# Patient Record
Sex: MALE | Race: BLACK OR AFRICAN AMERICAN | Employment: UNEMPLOYED | URBAN - METROPOLITAN AREA
[De-identification: names, ages, dates, MRNs, and addresses within clinical notes are randomized per-mention and may not be internally consistent; named-entity substitution may affect disease eponyms.]

---

## 2021-01-01 ENCOUNTER — HOSPITAL ENCOUNTER (EMERGENCY)
Age: 81
End: 2021-08-31
Attending: EMERGENCY MEDICINE
Payer: MEDICARE

## 2021-01-01 DIAGNOSIS — I46.9 CARDIOPULMONARY ARREST (HCC): Primary | ICD-10-CM

## 2021-01-01 PROCEDURE — 31500 INSERT EMERGENCY AIRWAY: CPT

## 2021-01-01 PROCEDURE — 99284 EMERGENCY DEPT VISIT MOD MDM: CPT

## 2021-08-31 NOTE — ED NOTES
Called Dr. Cazares Standard [phone] at [phone], speaking to Panchito Regalado 0900     Velma [facility]  08/31/21 8911

## 2021-08-31 NOTE — ED NOTES
This RN spoke with Randall at the 's office. Pt is \"not a 's case\".      Marti Choi RN  08/31/21 55

## 2021-08-31 NOTE — ED NOTES
0531-Epinephrine and D50 given. 0535-Epinephrine and D50 given, amiodarone given. 0538-Pulse check, no pulse, compressions resumed. 0539-Epinephrine given. 0543-Calcium given. 0543-Pulse check, no pulse, compressions resumed. 0544-Atropine given. 0547-Epinephrine given. 0548-Pulse check, no pulse, compressions resumed. 0550-Epinephrine given. 0554-Pulse check, no pulse. Time of death 12.                Arnel Gotti RN  08/31/21 [facility]  08/31/21 0806

## 2021-08-31 NOTE — ED NOTES
Called Dr. Deondre Murphy [phone] at [phone] speaking to [address] HENRIK Kim [facility]  08/31/21 2350

## 2021-08-31 NOTE — ED TRIAGE NOTES
Pt in via EMS with \"witnessed cardiac arrest\". EMS states that pt \"went down at 0500\". Pt received \"3 rounds of epi\" and \"2 shocks\" en route. Pt presents to ED in cardiac arrest and getting compressions via [place] device at this time.

## 2021-08-31 NOTE — ED PROVIDER NOTES
heart failure) ([facility])     DR Jefry Fuentes    Hyperlipidemia     Hypertension          SURGICALHISTORY     No past surgical history on file. CURRENT MEDICATIONS       Previous Medications    ALBUTEROL (PROVENTIL HFA;VENTOLIN HFA) 108 (90 BASE) MCG/ACT INHALER    Inhale 2 puffs into the lungs every 6 hours as needed for Wheezing. ALBUTEROL (PROVENTIL HFA;VENTOLIN HFA) 108 (90 BASE) MCG/ACT INHALER    Inhale 2 puffs into the lungs every 6 hours as needed for Wheezing. ALLOPURINOL (ZYLOPRIM) 100 MG TABLET    TAKE 1 TABLET BY MOUTH THREE TIMES DAILY    BISOPROLOL-HYDROCHLOROTHIAZIDE (ZIAC) 5-6.25 MG PER TABLET    Take 1 tablet by mouth daily. DILTIAZEM (CARTIA XT) 240 MG CP24    TAKE ONE CAPSULE BY MOUTH EVERY DAY    FLUTICASONE (FLOVENT HFA) 220 MCG/ACT AERO    INHALE 2 PUFFS INTO THE LUNGS TWICE DAILY    LISINOPRIL (PRINIVIL;ZESTRIL) 10 MG TABLET    TAKE 1 TABLET BY MOUTH EVERY DAY    LISINOPRIL (PRINIVIL;ZESTRIL) 10 MG TABLET    TAKE 1 TABLET BY MOUTH EVERY DAY    OMEPRAZOLE (PRILOSEC) 20 MG CAPSULE    Take 1 capsule by mouth daily. PRAVASTATIN (PRAVACHOL) 40 MG TABLET    TAKE 1 TABLET BY MOUTH EVERY DAY    SACCHAROMYCES BOULARDII (FLORASTOR) 250 MG CAPSULE    Take 250 mg by mouth 4 times daily. SPIRONOLACTONE (ALDACTONE) 25 MG TABLET    Take 1/2 tablet everyday. TRIMETHOPRIM (TRIMPEX) 100 MG TABLET    Take 100 mg by mouth nightly. Patient has no known allergies. FAMILY HISTORY     No family history on file. SOCIAL HISTORY       Social History     Socioeconomic History    Marital status:      Spouse name: Not on file    Number of children: Not on file    Years of education: Not on file    Highest education level: Not on file   Occupational History    Not on file   Tobacco Use    Smoking status: Former Smoker     Packs/day: 0.25    Smokeless tobacco: Never Used    Tobacco comment: Smokes cigars 2-3 every day   Substance and Sexual Activity    Alcohol use:  Yes    Drug use: No    Sexual activity: Not Currently   Other Topics Concern    Not on file   Social History Narrative    Not on file     Social Determinants of Health     Financial Resource Strain:     Difficulty of Paying Living Expenses:    Food Insecurity:     Worried About Running Out of Food in the Last Year:     [address] in the Last Year:    Transportation Needs:     Lack of Transportation (Medical):  Lack of Transportation (Non-Medical):    Physical Activity:     Days of Exercise per Week:     Minutes of Exercise per Session:    Stress:     Feeling of Stress :    Social Connections:     Frequency of Communication with Friends and Family:     Frequency of Social Gatherings with Friends and Family:     Attends Spiritism Services:     Active Member of Clubs or Organizations:     Attends Club or Organization Meetings:     Marital Status:    Intimate Partner Violence:     Fear of Current or Ex-Partner:     Emotionally Abused:     Physically Abused:     Sexually Abused:        SCREENINGS             PHYSICAL EXAM    (up to 7 for level 4, 8 or more for level 5)     ED Triage Vitals   BP Temp Temp src Pulse Resp SpO2 Height Weight   -- -- -- -- -- -- -- --       Physical Exam  Vitals and nursing note reviewed. Constitutional:       Appearance: He is cachectic. He is ill-appearing. Interventions: He is intubated. HENT:      Nose: Nose normal.      Mouth/Throat:      Mouth: Mucous membranes are dry. Eyes:      Comments: Pupils fixed and nonreactive   Cardiovascular:      Comments: Asystole and pulselessness  Pulmonary:      Effort: He is intubated. Comments: Apnea, with bilateral breath sounds with bagged  Abdominal:      General: Abdomen is flat. Palpations: Abdomen is soft. Musculoskeletal:      Right lower leg: Edema present. Left lower leg: Edema present. Skin:     General: Skin is warm and dry. Capillary Refill: Capillary refill takes more than 3 seconds. Neurological:      Mental Status: He is unresponsive. GCS: GCS eye subscore is 1. GCS verbal subscore is 1. GCS motor subscore is 1. RESULTS     EKG: All EKG's are interpreted by the Emergency Department Physician who either signs or Co-signsthis chart in the absence of a cardiologist.      RADIOLOGY:   Para [place] such as CT, Ultrasound and MRI are read by the radiologist. Plain radiographic images are visualized and preliminarily interpreted by the emergency physician with the below findings:        Interpretation per the Radiologist below, if available at the time ofthis note:    No orders to display         ED BEDSIDE ULTRASOUND:   Performed by ED Physician - none    LABS:  Labs Reviewed - No data to display    All other labs were within normal range or not returned as of this dictation. EMERGENCY DEPARTMENT COURSE and DIFFERENTIAL DIAGNOSIS/MDM:   Vitals: There were no vitals filed for this visit. Patient was given thefollowing medications:  Medications - No data to display    ED [facility]    Pertinent Labs & Imaging studies reviewed. (See chart for details)   -  Patient seen and evaluated in the emergency department. -  Triage and nursing notes reviewed and incorporated. -  Old chart records reviewed and incorporated.   -  Differential diagnosis includes: Differential Diagnosis:    Hypoxemia/ischemic encephalopathy, hepatic encephalopathy   Seizure or postictal state   Alterations of glucose such as hypoglycemia and hyperglycemia    Alterations in perfusions such as hypotension and hypoperfusion    Alterations in electrolytes such as disturbances in sodium or calcium   Infectious processes such as sepsis from a pneumonia or urinary tract infection    Substance use or withdrawal, especially alcohol and drugs    Medication adverse event or interaction    Vitamin deficiencies such as Wernicke's encephalopathy    CNS lesion, injury, infection (CVA, subdural hematoma, meningitis, encephalitis)    Alterations in hormones such as thyroid or adrenal abnormalities    Alterations in cardiac functioning such as arrhythmia, MI or CHF    Alteration in temperature such as hyperthermia or hypothermia    Dehydration, sleep deprivation   Change in medical regimen    Alteration in lifestyle, environment, or personal relationships    -  Work-up included:  See above  -  ED treatment included: See above  -Patient arrived as a full arrest from the field. Reported initial rhythm was wide-complex tachycardia with pulselessness. Patient received amiodarone and additional epinephrine on arrival.  Patient was intubated in 1 attempt using glide scope. blood sugar was noticed to be 56 he was given dextrose. CPR was continued with the patient receiving epinephrine at regular intervals. Please see code sheet for full details. Every pulse check demonstrated asystole. CPR was continued without reestablishing perfusable rhythm. Due to the patient's age and comorbidities and prolonged downtime and I think there would be significant neurological outcome to continue and laxity measures were ceased at 0555. REASSESSMENT          CRITICAL CARE TIME   Total Critical Care time was 35 minutes, excluding separately reportable procedures. There was a high probability of clinically significant/life threatening deterioration in the patient's condition which required my urgent intervention.       CONSULTS:  None    PROCEDURES:  Unless otherwise noted below, none     Intubation    Date/Time: 8/31/2021 6:15 AM  Performed by: Amanda Ingram MD  Authorized by: Amanda Ingram MD     Consent:     Consent obtained:  Emergent situation    Alternatives discussed:  No treatment and delayed treatment  Pre-procedure details:     Patient status:  Unresponsive    Mallampati score:  IV    Pretreatment medications:  None    Paralytics:  None  Procedure details:     Preoxygenation:  PRATEEK/LMA    CPR in progress: yes Intubation method:  Oral    Oral intubation technique:  Video-assisted    Laryngoscope blade: Mac 3    Tube size (mm):  7.5    Tube type:  Cuffed    Number of attempts:  1    Ventilation between attempts: no      Cricoid pressure: no      Tube visualized through cords: yes    Placement assessment:     ETT to lip:  24    Tube secured with:  ETT ambrosio    Breath sounds:  Equal    Placement verification: direct visualization    Post-procedure details:     Patient tolerance of procedure: Tolerated well, no immediate complications        FINAL IMPRESSION      1. Cardiopulmonary arrest [facility])          DISPOSITION/PLAN   DISPOSITION  2021 06:09:50 AM      PATIENT REFERREDTO:  No follow-up provider specified.     DISCHARGEMEDICATIONS:  New Prescriptions    No medications on file          (Please note that portions of this note were completed with a voice recognition program.  Efforts were made to edit the dictations but occasionally words are mis-transcribed.)    Michelet Hernandez MD (electronically signed)  Attending Emergency Physician         Michelet Hernandez MD  [phone]

## 2021-08-31 NOTE — ED NOTES
This RN spoke with Марина Preciado at [facility]. They are not \"following for donation\".     462 RANDI Jarquin RN  08/31/21 6957 Thyroid Ultrasound Test (to be done 07/2019 prior to follow up with Dr. Gaffney)   *Call [phone] to schedule     Date:  Time:   Location:    What is an ultrasound test?   An ultrasound is a simple, painless test that uses sound waves to look at organs inside of your body (heart, thyroid, spleen, uterus, bladder, gallbladder and others).  The sound waves are sent into your body by a hand-held instrument called a transducer.     When the sound waves come in contact with an organ, the sound waves bounce back through the transducer and created a picture on the ultrasound screen.  Your doctor uses this picture to see if you have any problems with the organ being tested.  No radiation is used for this test.  Ultrasounds can show your doctor things that a regular X-ray cannot.      How do I prepare for an ultrasound?   Thyroid ultrasound tests do not require preparation.     What can I expect during the ultrasound?   Before starting, the technologist will explain your test and answer your questions.  You will be asked to lie on your back, stomach or side depending on the type of test you will have.  The technologist will use a gel on the transducer to help make better contact with the transducer and your skin.      The transducer is placed on the area of your body that your doctor wants to examine.  You may feel some pressure against your skin from the transducer in the way a full bladder can cause discomfort.  You will be asked to lie very still during the test.     What else do I need to know?   -Expect the test to take about an hour   -Check with your insurance company about coverage, if necessary   -Please call Central Scheduling if you are not able to keep your appointment: [phone]  -If you are scheduled at [facility], please arrive 15 minutes early to register.

## 2021-08-31 NOTE — PROGRESS NOTES
Patient intubated with 7.5 Tube; 24LL. Tube visualized going through the vocal cord along with positive ETCO2 color change. Patient bagged with 100% Fio2 throughout the entire code.  Electronically signed by Lynda De La Rosa RCP on 8/31/2021 at 6:05 AM

## 2021-09-01 ENCOUNTER — TELEPHONE (OUTPATIENT)
Dept: SOCIAL WORK | Age: 81
End: 2021-09-01

## 2021-09-01 NOTE — TELEPHONE ENCOUNTER
Nnamdi morocho called and requested name and phone #'s for family- provided spouse and daughter's names and #'s.